# Patient Record
Sex: FEMALE | Race: ASIAN
[De-identification: names, ages, dates, MRNs, and addresses within clinical notes are randomized per-mention and may not be internally consistent; named-entity substitution may affect disease eponyms.]

---

## 2018-06-28 ENCOUNTER — HOSPITAL ENCOUNTER (OUTPATIENT)
Dept: HOSPITAL 35 - RAD | Age: 58
End: 2018-06-28
Attending: FAMILY MEDICINE
Payer: COMMERCIAL

## 2018-06-28 DIAGNOSIS — Z12.31: Primary | ICD-10-CM

## 2018-08-27 ENCOUNTER — HOSPITAL ENCOUNTER (OUTPATIENT)
Dept: HOSPITAL 61 - SPEC | Age: 58
Discharge: HOME | End: 2018-08-27
Attending: OBSTETRICS & GYNECOLOGY
Payer: COMMERCIAL

## 2018-08-27 DIAGNOSIS — R87.615: Primary | ICD-10-CM

## 2018-08-27 PROCEDURE — 88175 CYTOPATH C/V AUTO FLUID REDO: CPT

## 2019-12-23 ENCOUNTER — HOSPITAL ENCOUNTER (OUTPATIENT)
Dept: HOSPITAL 35 - CV | Age: 59
End: 2019-12-23
Attending: INTERNAL MEDICINE
Payer: COMMERCIAL

## 2019-12-23 DIAGNOSIS — I20.8: Primary | ICD-10-CM

## 2019-12-23 NOTE — EXE
[facility]
[address]
[place]  [address]
Phone:  [phone]                    STRESS ECHOCARDIOGRAM         
_______________________________________________________________________________
 
[facility]            [facility]                    [facility] #:                    REG JERSEY ELISEBARBARAAlberto#:           [id number]          Account #:     [id number]  
Admission:       12/23/19         Attend Phys:   Denys Sandoval MD   
Discharge:                  Date of Birth: [birth year]  
                         Report #:      [id number]
        [id number]
_______________________________________________________________________________
THIS REPORT FOR:   //name//                          
 
 
--------------- APPROVED REPORT --------------
 
 
Study performed:  12/23/2019 10:53:49
 
Exam:  Stress Echocardiogram
Indication: Chest pain
Patient Location: Out-Patient
Room #: Echo lab 2
Status: routine
 
Ht: 5 ft 1 in      
HR: 57 bpm       BP: 122/78 mmHg 
Rhythm: Bradycardia   
 
Medical History
Exercise History: Physically active
 
Procedure
The patient underwent an Exercise Stress Test using the Kemar 
Protocol. Blood pressure, heart rate, and EKG were monitored.
An Echocardiogram was performed by technician in four stages in quad 
fashion.  At peak stress, four selected images were obtained and 
placed side by side with resting images for comparison.
 
Stress Test Details
Stress Test:  Exercise stress testing was performed using a Kemar 
protocol.      
HR           
Resting HR:            57 bpm   Max Heart Rate (APMHR): 163 bpm  
Max HR Achieved:  153 bpm   Target HR (85% APMHR): 138 bpm  
% of APMHR:         93         
Recovery HR:            88 bpm        
HR response to stress: Normal HR response to stress      
 
BP           
Resting BP:  122/78 mmHg        
Max BP:       142/88 mmHg        
Recovery BP:       118/68 mmHg        
BP response to stress: Normal blood pressure response to 
stress.      
ECG           
Resting ECG:  Sinus Rhythm        
 
 
[facility]
[address]
[place]  [address]
Phone:  [phone]                    STRESS ECHOCARDIOGRAM         
_______________________________________________________________________________
 
[facility]            [facility]                    Room #:                    REG [facility]#:           [id number]          Account #:     [id number]  
Admission:       12/23/19         Attend Phys:   Denys Sandoval MD   
Discharge:                  Date of Birth: [birth year]  
                         Report #:      [id number]
        [id number]
_______________________________________________________________________________
Stress ECG:     Sinus Rhythm, nonspecific ST-T abnormalities      
ST Change: Non-ischemic         
 
Clinical
Reason for Termination: Maximal effort
Exercise duration: 10 min sec
Highest Stage Achieved: Stage 4: 4.2 mph at 16% grade. 
Exercise capacity: 13.3 METs
Overall Exercise Capacity for Age: Good
 
Pre-Stress Echo
The resting Echocardiogram showed normal left ventricular 
contractility with an estimated Ejection Fraction of about >55%. 
The resting echocardiogram demonstrated normal wall motion in all 
wall segments.  
Normal wall motion in all segments on baseline images.
 
Post-Stress Echo
The stress Echocardiogram showed normal left ventricular 
contractility with an estimated Ejection Fraction of about 65-70%. 
Compared to rest, there were no stress-induced wall motion 
abnormalities. 
Normal augmentation of wall motion in all segments on post stress 
images. 
 
Clinical
No clinical evidence for ischemia. 
 
Conclusion
Clinical Response:  Ischemic
Exercise Capacity:  Average
Stress ECG Response:  Indeterminant
Stress Echo Images:  Non-ischemic
The patient developed chest discomfort with walking on the treadmill, 
did not correlate with any echocardiographic 
abnormalities.
Initially, the heart rate increased with walking on the treadmill, 
but declined as she went further into the protocol.
 
Other Information
Study Quality: Good
 
<Conclusion>
The patient developed chest discomfort with walking on the treadmill, 
did not correlate with any echocardiographic 
abnormalities.
 
 
[facility]
[address]
[place]  [address]
Phone:  [phone]                    STRESS ECHOCARDIOGRAM         
_______________________________________________________________________________
 
Name:            [facility]                    Room #:                    REG [facility]#:           [id number]          Account #:     [id number]  
Admission:       12/23/19         Attend Phys:   Denys Sandoval MD   
Discharge:                  Date of Birth: [birth year]  
                         Report #:      [id number]
        [id number]
_______________________________________________________________________________
Initially, the heart rate increased with walking on the treadmill, 
but declined as she went further into the protocol.
 
 
 
 
 
 
 
 
 
 
 
 
 
 
 
 
 
 
 
 
 
 
 
 
 
 
 
 
 
 
 
 
 
 
 
 
 
 
 
 
 
 
  <ELECTRONICALLY SIGNED>
   By: Denys Sandoval MD               
  12/23/19     1247
D: 12/23/19 1247                           _____________________________________
T: 12/23/19 1247                           Denys Sandoval MD                 /INF

## 2021-04-21 ENCOUNTER — HOSPITAL ENCOUNTER (OUTPATIENT)
Dept: HOSPITAL 35 - RAD | Age: 61
End: 2021-04-21
Attending: FAMILY MEDICINE
Payer: COMMERCIAL

## 2021-04-21 DIAGNOSIS — Z12.31: Primary | ICD-10-CM

## 2021-06-10 ENCOUNTER — HOSPITAL ENCOUNTER (OUTPATIENT)
Dept: HOSPITAL 35 - LAB | Age: 61
End: 2021-06-10
Payer: COMMERCIAL

## 2021-06-10 DIAGNOSIS — Z20.822: ICD-10-CM

## 2021-06-10 DIAGNOSIS — Z01.812: Primary | ICD-10-CM

## 2021-06-11 ENCOUNTER — HOSPITAL ENCOUNTER (OUTPATIENT)
Dept: HOSPITAL 35 - GI | Age: 61
Discharge: HOME | End: 2021-06-11
Attending: SPECIALIST
Payer: COMMERCIAL

## 2021-06-11 VITALS — HEIGHT: 60.98 IN | BODY MASS INDEX: 21.52 KG/M2 | WEIGHT: 114 LBS

## 2021-06-11 DIAGNOSIS — Z98.890: ICD-10-CM

## 2021-06-11 DIAGNOSIS — Z12.11: Primary | ICD-10-CM

## 2021-06-11 DIAGNOSIS — D12.2: ICD-10-CM

## 2021-06-11 DIAGNOSIS — F45.8: ICD-10-CM

## 2021-06-11 DIAGNOSIS — B96.81: ICD-10-CM

## 2021-06-11 DIAGNOSIS — D12.3: ICD-10-CM

## 2021-06-11 DIAGNOSIS — K29.60: ICD-10-CM

## 2021-06-11 PROCEDURE — 62110: CPT

## 2021-06-11 PROCEDURE — 62900: CPT

## 2021-06-12 NOTE — P
[facility]
[address]
[place]   [address]                     PROCEDURE REPORT              
_______________________________________________________________________________
 
[facility]                    [facility]:                     REG NEREIDA 
KENN.#:      [id number]                       Account #:      [id number]  
Admission:  06/11/21    Attend Phys:    Wolf Bell
Discharge:              Date of Birth:  [birth year]  
                                                          Report #: [id number]
                                                                    [id number] 
_______________________________________________________________________________
THIS REPORT FOR:  
 
cc:  Asad Hubbard MD, Neal A. MD McElhinney, Christian C. MD                                   ~
 
DOC #: [id number]
 
cc:     MD Wolf Velarde MD
DATE OF SERVICE: 06/11/2021
 
PROCEDURE PERFORMED:  Colonoscopy with biopsies.
 
HISTORY OF PRESENT ILLNESS:  The patient is a 60-year-old female who presents 
today for routine screening colonoscopy.  No previous history of colonoscopy.  
No family history of colon cancer.  Bowel movements have been normal.
 
DESCRIPTION OF PROCEDURE:  The risks and benefits of the procedure were 
explained to the patient, those risks including but not limited to bleeding, 
perforation and the risk of sedation.  She understood these risks and gave 
informed consent.  Sedation was given using propofol per anesthesia.  Next, a 
digital rectal exam was initially performed, which was normal.  Next, using a 
standard Olympus colonoscope, the scope was placed in the patient's anus and 
advanced under direct vision to the cecum.  The overall prep was excellent.  The
cecum and ileocecal valve were normal in appearance.  In the ascending colon, a 
3 mm sessile polyp was noted.  This was removed with cold forceps, otherwise 
normal.  In the transverse colon, a 5 mm sessile polyp also removed with cold 
forceps.  The descending and sigmoid colon were normal.  The rectal mucosa was 
normal.  On retroflexion, no abnormalities were noted.  The scope was then 
withdrawn and the procedure terminated.  The patient tolerated the procedure 
well.
 
IMPRESSION:
1.  Two small colonic polyps.
2.  Otherwise, normal colonoscopy.
 
RECOMMENDATIONS:
1.  Await biopsy results.
2.  If polyps are hyperplastic, repeat in 10 years; if adenomatous polyp, repeat
in 5 years.
 
Thank you for allowing me to participate in her care.
 
Wolf Montejo MD
[facility]/[address]   [address]                     PROCEDURE REPORT              
_______________________________________________________________________________
 
[facility]       [facility]                    [facility]                     [facility]      [id number]                       Account #:      [id number]  
Admission:  06/11/21    Attend Phys:    Wolf Bell
Discharge:              Date of Birth:  [birth year]  
                                                          Report #: [id number]
                                                                    [id number] 
_______________________________________________________________________________
 
 
D: 06/11/2021 09:11 AM
T: 06/11/2021 10:53 PM
 
 
 
 
 
 
 
 
 
 
 
 
 
 
 
 
 
 
 
 
 
 
 
 
 
 
 
 
 
 
 
 
 
 
 
 
 
 
 
 
  <ELECTRONICALLY SIGNED>
   By: Wolf Montejo MD   
  06/12/21     1111
D: 06/11/21 0811                           _____________________________________
T: 06/11/21 2153                           Wolf Montejo MD     /nt
[facility]
[address]
[place]   [address]                     PROCEDURE REPORT              
_______________________________________________________________________________
 
[facility]       [facility]                    Room #:                     REG CLJOSEPH OJEDA#:      [id number]                       Account #:      [id number]  
Admission:  06/11/21    Attend Phys:    Wolf Bell
Discharge:              Date of Birth:  [birth year]  
                                                          Report #: [id number]
                                                                    [id number] 
_______________________________________________________________________________
THIS REPORT FOR:  
 
cc:  Asad Hubbard MD, Neal A. MD McElhinney, Christian C. MD                                   ~
 
DOC #: [id number]
 
cc:     MD Wolf Velarde MD
DATE OF SERVICE: 06/11/2021
 
PROCEDURE PERFORMED:  Upper endoscopy with biopsies and esophageal dilation.
 
HISTORY OF PRESENT ILLNESS:  The patient is a 60-year-old female who complains 
of globus type sensation.  Denies any true dysphagia or odynophagia.  No 
significant heartburn symptoms.  She feels like after eating, she will feel 
pressure or food in her upper esophagus.  No nausea or vomiting.
 
DESCRIPTION OF PROCEDURE:  The risks and benefits of the procedure were 
explained to the patient, those risks including but not limited to bleeding, 
perforation and the risk of sedation.  She understood these risks and gave 
informed consent.  Sedation was given using propofol per anesthesia.  Next, 
using a standard Olympus upper endoscope, the scope was placed in the patient's 
mouth and advanced under direct vision through the esophagus, stomach and into 
the second portion of the duodenum.  The larynx was normal in appearance.  The 
esophagus was normal throughout.  There was no evidence of stricture, no 
evidence of esophagitis at the GE junction, which was normal.  Overall, the 
gastric mucosa did show a mild gastritis in the fundus.  The gastric body and 
antrum were normal.  Biopsies were obtained to rule out the possibility of H. 
pylori.  The pylorus was normal and patent.  The duodenal bulb, first and second
portion were all normal.  The scope was then brought back up into the patient's 
stomach and a Savary guidewire was inserted through the scope, leaving the 
guidewire in place as the scope was then withdrawn.  Next, a 48-Mohawk Savary 
dilation of the esophagus was performed without difficulty.  The wire and 
dilator removed.  The scope was reintroduced into the patient's stomach.  There 
was no evidence of mucosal tear after dilation.  The scope was then withdrawn 
and the procedure terminated.  The patient tolerated the procedure well.
 
IMPRESSION:
1.  Mild gastritis.
2.  Otherwise, normal upper endoscopy.
 
RECOMMENDATIONS:
1.  Await biopsy results.
2.  Observe the patient status post dilation.  If she has continued symptoms in 
 
 
 
[address]   [address]                     PROCEDURE REPORT              
_______________________________________________________________________________
 
[facility]       [facility]                    Room #:                     REG [facility].#:      [id number]                       Account #:      [id number]  
Admission:  06/11/21    Attend Phys:    Wolf Bell
Discharge:              Date of Birth:  [birth year]  
                                                          Report #: [id number]
                                                                    [id number] 
_______________________________________________________________________________
 
the future, could consider video swallow or upper GI.
 
Thank you for allowing me to participate in her care.
 
Wolf Montejo MD CCM/JAZMINE
 
D: 06/11/2021 08:43 AM
T: 06/11/2021 10:37 PM
 
 
 
 
 
 
 
 
 
 
 
 
 
 
 
 
 
 
 
 
 
 
 
 
 
 
 
 
 
 
 
 
 
 
  <ELECTRONICALLY SIGNED>
   By: Wolf Montejo MD   
  06/12/21     1111
D: 06/11/21 0743                           _____________________________________
T: 06/11/21 2137                           Wolf Montejo MD     /nt
No

## 2021-06-14 NOTE — PATH
[facility]
[address]
[place]   [address]                     PATHOLOGY RPT PROCEDURE       
_______________________________________________________________________________
 
[facility]                    [facility]                     REG CLI 
GINA.BARBARA.#:      [id number]     Account #:      [id number]  
Admission:  06/11/21    Date of Birth:  [birth year]  
Discharge:                                              Report #:    [id number]
                                                        Path Case #: [id number]
_______________________________________________________________________________
 
[facility] Accession Number: [id number]
.                                                                01
Material submitted:                                        .
PART A: gastrointestinal site - BIOPSY GASTRITIS
PART B: colon - BIOPSY TRANSVERSE COLON POLYP. Modifiers: transverse
PART C: colon - BIOPSY ASCENDING COLON POLYP. Modifiers: ascending
.                                                                01
Clinical history:                                          .
EGD/SCREENING
DYSPHAGIA,GLOBUS SENSATION
GASTRITIS,COLON POLYPS
.                                                                02
**********************************************************************
Diagnosis:
A. Gastric mucosa, gastritis, rule out H. pylori, endoscopic biopsy:
- Helicobacter pylori induced moderate active gastritis.
- Negative for intestinal metaplasia or atrophia or dysplasia.
- Numerous Helicobacter pylori organisms present on the properly
controlled immunohistochemical stain.
.
B. Polyp, transverse colon, endoscopic biopsy:
- Tubular adenoma.
- Negative for high-grade dysplasia.
.
C. Polyp, ascending colon polyp, endoscopic biopsy:
- Tubular adenoma.
- Negative for high-grade dysplasia.
(IUV:pit; 06/14/2021)
QTP  06/14/2021  1840 Local
**********************************************************************
.                                                                02
Electronically signed:                                     .
Jennifer Kitchen MD, Pathologist
NPI- [id number]
.                                                                01
Gross description:                                         .
A.  Received in formalin labeled "Jaja Linares, BX gastritis rule out H.
pylori" are multiple tan-brown soft tissue fragments measuring in
aggregate 1.2 x 0.5 x 0.1 cm. The specimen is submitted entirely in A1.
.
B.  Received in formalin labeled "Jaja Linares, BX transverse colon polyp"
are 2 tan-brown soft tissue fragments measuring in aggregate 0.4 x 0.4 x
0.1 cm. The specimen is submitted entirely in B1.
.
C.  Received in formalin labeled "Jaja Linares, BX ascending colon polyp"
are 2 tan-brown soft tissue fragments measuring in aggregate 0.5 x 0.5 x
0.1 cm. The specimen is submitted entirely in C1. ([facility]; 6/13/2021)
 
 
[place] [address]                     PATHOLOGY RPT PROCEDURE       
_______________________________________________________________________________
 
Name:       JAJA LINARES                    Room #:                     REG NEREIDA OJEDA#:      [id number]     Account #:      [id number]  
Admission:  06/11/21    Date of Birth:  [birth year]  
Discharge:                                              Report #:    [id number]
                                                        Path Case #: [id number]
_______________________________________________________________________________
[facility]  06/13/2021  1211 Local
.                                                                02
Pathologist provided ICD-10:
K31.7, K29.60, B96.81, D12.3, D12.2
.                                                                02
CPT                                                        .
172839, 085417, 356233, Z24451
Specimen Comment: A courtesy copy of this report has been sent to [phone],
[phone]
Specimen Comment: [id number]
Specimen Comment: Report sent to  / DR MACIEL
***Performed at:  01
   [facility], [place]  [id number]
   MD Sriram Vargas MD Phone:  [phone]
***Performed at:  02
   [facility]  [id number]
   MD Jennifer Kitchen MD Phone:  [phone]

## 2021-07-16 ENCOUNTER — HOSPITAL ENCOUNTER (OUTPATIENT)
Dept: HOSPITAL 35 - LAB | Age: 61
End: 2021-07-16
Attending: SPECIALIST
Payer: COMMERCIAL

## 2021-07-16 DIAGNOSIS — A04.8: Primary | ICD-10-CM
